# Patient Record
Sex: FEMALE | Race: WHITE | URBAN - METROPOLITAN AREA
[De-identification: names, ages, dates, MRNs, and addresses within clinical notes are randomized per-mention and may not be internally consistent; named-entity substitution may affect disease eponyms.]

---

## 2018-05-08 ENCOUNTER — OFFICE VISIT (OUTPATIENT)
Dept: FAMILY MEDICINE CLINIC | Age: 43
End: 2018-05-08

## 2018-05-08 VITALS
BODY MASS INDEX: 23.9 KG/M2 | OXYGEN SATURATION: 97 % | HEIGHT: 64 IN | RESPIRATION RATE: 18 BRPM | TEMPERATURE: 98.2 F | WEIGHT: 140 LBS | HEART RATE: 81 BPM | DIASTOLIC BLOOD PRESSURE: 76 MMHG | SYSTOLIC BLOOD PRESSURE: 124 MMHG

## 2018-05-08 DIAGNOSIS — F34.1 DYSTHYMIA: ICD-10-CM

## 2018-05-08 RX ORDER — SERTRALINE HYDROCHLORIDE 25 MG/1
25 TABLET, FILM COATED ORAL DAILY
Qty: 90 TAB | Refills: 1 | Status: SHIPPED | OUTPATIENT
Start: 2018-05-08

## 2018-05-08 NOTE — MR AVS SNAPSHOT
[address] [address] 
[phone] Patient: Magalis Gentile MRN: [id number] BUE:[id number] Visit Information Date & Time Provider Department Dept. Phone Encounter #  
 5/8/2018  8:50 AM Shade Mccoy MD [address] [facility] [phone] [id number] Upcoming Health Maintenance Date Due DTaP/Tdap/Td series (1 - Tdap) 7/25/1995 PAP AKA CERVICAL CYTOLOGY 7/25/1995 Influenza Age 5 to Adult 8/1/2018 Allergies as of 5/8/2018  Review Complete On: 5/8/2018 By: Shade Mccoy MD  
 No Known Allergies Current Immunizations  Never Reviewed No immunizations on file. Not reviewed this visit You Were Diagnosed With   
  
 Codes Comments Dysthymia     ICD-10-CM: F34.1 ICD-9-CM: 300.4 Vitals BP Pulse Temp Resp Height(growth percentile) Weight(growth percentile) 124/76 (BP 1 Location: Left arm, BP Patient Position: Sitting) 81 98.2 °F (36.8 °C) (Oral) 18 5' 3.5\" (1.613 m) 140 lb (63.5 kg) SpO2 BMI OB Status Smoking Status 97% 24.41 kg/m2 Postpartum Former Smoker Vitals History BMI and BSA Data Body Mass Index Body Surface Area  
 24.41 kg/m 2 1.69 m 2 Preferred Pharmacy Pharmacy Name Phone [id number] [address] [facility] [phone] Your Updated Medication List  
  
   
This list is accurate as of 5/8/18  9:46 AM.  Always use your most recent med list.  
  
  
  
  
 sertraline 25 mg tablet Commonly known as:  ZOLOFT Take 1 Tab by mouth daily. Prescriptions Sent to Pharmacy Refills  
 sertraline (ZOLOFT) 25 mg tablet 1 Sig: Take 1 Tab by mouth daily. Class: Normal  
 Pharmacy: [facility] [address] Ph #: [phone] Route: Oral  
  
Introducing [facility]!    
 Annie Kim introduces [employer] patient portal. Now you can access parts of your medical record, email your doctor's office, and request medication refills online. 1. In your internet browser, go to [email or web address] 2. Click on the First Time User? Click Here link in the Sign In box. You will see the New Member Sign Up page. 3. Enter your [employer] Access Code exactly as it appears below. You will not need to use this code after youve completed the sign-up process. If you do not sign up before the expiration date, you must request a new code. · [employer] Access Code: [email or web address] Expires: 8/6/2018  9:46 AM 
 
4. Enter the last four digits of your Social Security Number (xxxx) and Date of Birth (mm/dd/yyyy) as indicated and click Submit. You will be taken to the next sign-up page. 5. Create a [employer] ID. This will be your [employer] login ID and cannot be changed, so think of one that is secure and easy to remember. 6. Create a [employer] password. You can change your password at any time. 7. Enter your Password Reset Question and Answer. This can be used at a later time if you forget your password. 8. Enter your e-mail address. You will receive e-mail notification when new information is available in [address] 9. Click Sign Up. You can now view and download portions of your medical record. 10. Click the Download Summary menu link to download a portable copy of your medical information. If you have questions, please visit the Frequently Asked Questions section of the [employer] website. Remember, [employer] is NOT to be used for urgent needs. For medical emergencies, dial 911. Now available from your iPhone and Android! Please provide this summary of care documentation to your next provider. If you have any questions after today's visit, please call [phone].

## 2018-05-08 NOTE — PROGRESS NOTES
Pt here to discuss recent depression. Also reports having frequent panic attacks,  Pt states she has been very emotional.     Pt reports that her  [place], she is alone with her autistic daughter. Pt reports that took zoloft years ago after the death of her father with benefit. Pt reports that she feels angry and on edge. Pt reports that she does not often leave the house. Subjective: (As above and below)     Chief Complaint   Patient presents with    Depression     she is a 36y.o. year old female who presents for evaluation. Reviewed PmHx, RxHx, FmHx, SocHx, AllgHx and updated in chart. Review of Systems - negative except as listed above    Objective:     Vitals:    05/08/18 0859   BP: 124/76   Pulse: 81   Resp: 18   Temp: 98.2 °F (36.8 °C)   TempSrc: Oral   SpO2: 97%   Weight: 140 lb (63.5 kg)   Height: 5' 3.5\" (1.613 m)     Physical Examination: General appearance - alert, well appearing, and in no distress  Mental status - normal mood, behavior, speech, dress, motor activity, and thought processes  Mouth - mucous membranes moist, pharynx normal without lesions  Chest - clear to auscultation, no wheezes, rales or rhonchi, symmetric air entry  Heart - normal rate, regular rhythm, normal S1, S2, no murmurs, rubs, clicks or gallops  Musculoskeletal - no joint tenderness, deformity or swelling    Assessment/ Plan:   1. Dysthymia  -Patient education: Side effects are common when starting SSRI therapy. Common side effects include headache, nausea, and diarrhea but these symptoms usually resolve after 2-3 weeks of therapy. Taking your SSRI with medication can help improve these side effects. There is also potential to experience a withdrawal syndrome with rapidly discontinuing SSRIs after 5 weeks of use. SSRI-withdrawal syndrome due to decreased amount of serotonin can result in dizziness, anxiety, nausea, sleep disturbance, and insomnia.    -resume medication daily  - sertraline (ZOLOFT) 25 mg tablet; Take 1 Tab by mouth daily. Dispense: 90 Tab; Refill: 1     Follow-up Disposition: As needed  I have discussed the diagnosis with the patient and the intended plan as seen in the above orders. The patient has received an after-visit summary and questions were answered concerning future plans.      Medication Side Effects and Warnings were discussed with patient: yes  Patient Labs were reviewed: yes  Patient Past Records were reviewed:  yes    Sim Crabtree M.D.

## 2019-03-06 PROBLEM — Z00.00 ENCOUNTER FOR PREVENTIVE HEALTH EXAMINATION: Status: ACTIVE | Noted: 2019-03-06

## 2019-03-15 ENCOUNTER — APPOINTMENT (OUTPATIENT)
Dept: INTERNAL MEDICINE | Facility: CLINIC | Age: 44
End: 2019-03-15
Payer: COMMERCIAL

## 2019-03-15 VITALS
TEMPERATURE: 98.8 F | WEIGHT: 125 LBS | SYSTOLIC BLOOD PRESSURE: 100 MMHG | HEIGHT: 64 IN | DIASTOLIC BLOOD PRESSURE: 70 MMHG | BODY MASS INDEX: 21.34 KG/M2

## 2019-03-15 DIAGNOSIS — Z87.891 PERSONAL HISTORY OF NICOTINE DEPENDENCE: ICD-10-CM

## 2019-03-15 DIAGNOSIS — Z82.5 FAMILY HISTORY OF ASTHMA AND OTHER CHRONIC LOWER RESPIRATORY DISEASES: ICD-10-CM

## 2019-03-15 DIAGNOSIS — Z81.8 FAMILY HISTORY OF OTHER MENTAL AND BEHAVIORAL DISORDERS: ICD-10-CM

## 2019-03-15 DIAGNOSIS — F32.9 MAJOR DEPRESSIVE DISORDER, SINGLE EPISODE, UNSPECIFIED: ICD-10-CM

## 2019-03-15 DIAGNOSIS — R41.840 ATTENTION AND CONCENTRATION DEFICIT: ICD-10-CM

## 2019-03-15 DIAGNOSIS — Z80.3 FAMILY HISTORY OF MALIGNANT NEOPLASM OF BREAST: ICD-10-CM

## 2019-03-15 LAB — CYTOLOGY CVX/VAG DOC THIN PREP: NORMAL

## 2019-03-15 PROCEDURE — 99203 OFFICE O/P NEW LOW 30 MIN: CPT

## 2019-03-15 RX ORDER — SERTRALINE HYDROCHLORIDE 50 MG/1
50 TABLET, FILM COATED ORAL
Qty: 90 | Refills: 1 | Status: DISCONTINUED | COMMUNITY
End: 2019-03-15

## 2019-03-15 RX ORDER — CETIRIZINE HCL/PSEUDOEPHEDRINE 5 MG-120MG
TABLET, EXTENDED RELEASE 12 HR ORAL
Refills: 0 | Status: ACTIVE | COMMUNITY

## 2019-03-15 NOTE — REVIEW OF SYSTEMS
[Anxiety] : anxiety [Depression] : depression [Negative] : Neurological [de-identified] : as noted in HPI

## 2019-03-15 NOTE — HISTORY OF PRESENT ILLNESS
[FreeTextEntry1] : f/u depression, ADD [de-identified] : 43-year-old female with a history of depression who is here for an initial visit. She moved to [place] from [facility] to be closer to family to help with her daughter who has autism. She, her  and her daughter are living her mother was present. She feels on edge. She has been on Zoloft 50 mg but feels it is not working as well as it was before. She also complains of difficulty concentrating

## 2019-03-15 NOTE — HEALTH RISK ASSESSMENT
[] : No [1] : 2) Feeling down, depressed, or hopeless for several days (1) [de-identified] : former [YearQuit] : 2000 [URE4Ogyns] : 2

## 2019-03-15 NOTE — PHYSICAL EXAM
[No Acute Distress] : no acute distress [Well Nourished] : well nourished [Normal Sclera/Conjunctiva] : normal sclera/conjunctiva [PERRL] : pupils equal round and reactive to light [Normal Outer Ear/Nose] : the outer ears and nose were normal in appearance [Normal Oropharynx] : the oropharynx was normal [Supple] : supple [No Lymphadenopathy] : no lymphadenopathy [No Respiratory Distress] : no respiratory distress  [Clear to Auscultation] : lungs were clear to auscultation bilaterally [No Accessory Muscle Use] : no accessory muscle use [Normal Rate] : normal rate  [Regular Rhythm] : with a regular rhythm [Normal S1, S2] : normal S1 and S2 [No Extremity Clubbing/Cyanosis] : no extremity clubbing/cyanosis [Soft] : abdomen soft [Non Tender] : non-tender [Non-distended] : non-distended [Normal Bowel Sounds] : normal bowel sounds [Normal Posterior Cervical Nodes] : no posterior cervical lymphadenopathy [Normal Anterior Cervical Nodes] : no anterior cervical lymphadenopathy [Normal Gait] : normal gait [No Focal Deficits] : no focal deficits [Normal Affect] : the affect was normal [Alert and Oriented x3] : oriented to person, place, and time

## 2019-03-15 NOTE — PLAN
[FreeTextEntry1] : Depression screening negative\par will increase zoloft to 100 mg. if she doesn’t note improvement we  may have to consider switching medications. she should also consider a therapist\par referral to psych for ADD evaluation

## 2019-06-06 ENCOUNTER — RESULT REVIEW (OUTPATIENT)
Age: 44
End: 2019-06-06

## 2019-06-12 ENCOUNTER — RX RENEWAL (OUTPATIENT)
Age: 44
End: 2019-06-12

## 2019-09-11 RX ORDER — SERTRALINE HYDROCHLORIDE 100 MG/1
100 TABLET, FILM COATED ORAL
Qty: 90 | Refills: 0 | Status: ACTIVE | COMMUNITY
Start: 2019-03-15 | End: 1900-01-01